# Patient Record
Sex: FEMALE | Race: WHITE
[De-identification: names, ages, dates, MRNs, and addresses within clinical notes are randomized per-mention and may not be internally consistent; named-entity substitution may affect disease eponyms.]

---

## 2018-06-09 ENCOUNTER — HOSPITAL ENCOUNTER (EMERGENCY)
Dept: HOSPITAL 97 - ER | Age: 58
Discharge: HOME | End: 2018-06-09
Payer: SELF-PAY

## 2018-06-09 VITALS — SYSTOLIC BLOOD PRESSURE: 134 MMHG | OXYGEN SATURATION: 96 % | TEMPERATURE: 97.6 F | DIASTOLIC BLOOD PRESSURE: 87 MMHG

## 2018-06-09 DIAGNOSIS — B02.9: Primary | ICD-10-CM

## 2018-06-09 DIAGNOSIS — F17.210: ICD-10-CM

## 2018-06-09 DIAGNOSIS — J44.9: ICD-10-CM

## 2018-06-09 PROCEDURE — 99283 EMERGENCY DEPT VISIT LOW MDM: CPT

## 2018-06-09 NOTE — EDPHYS
Physician Documentation                                                                           

 [facility]                                                                

Name: Glory Aceves                                                                                

Age: 58 yrs                                                                                       

Sex: Female                                                                                       

: 1960                                                                                   

MRN: [id number]                                                                                   

Arrival Date: 2018                                                                          

Time: 12:31                                                                                       

Account#: [id number]                                                                            

Bed 11                                                                                            

Private MD: None, None                                                                            

ED Physician Brian Hemphill                                                                      

HPI:                                                                                              

                                                                                             

15:02 This 58 yrs old  Female presents to ER via Ambulatory with complaints of Mouth bayron 

      Blisters.                                                                                   

15:02 The patient's rash thought to be caused by Dermatitis zoster like. The rash is located  bayron 

      on the right jaw and right temple and right ear and right cheek. The rash can be            

      described as erythematous, vesicular. Onset: The symptoms/episode began/occurred 3          

      day(s) ago. Associated signs and symptoms: Pertinent positives: itching, Pain swelling      

      of lips. Severity of symptoms: At their worst the symptoms were mild moderate in the        

      emergency department the symptoms are unchanged.                                            

                                                                                                  

Historical:                                                                                       

- Allergies:                                                                                      

12:58 No Known Allergies;                                                                     hb  

- PMHx:                                                                                           

12:58 COPD;                                                                                   hb  

- PSHx:                                                                                           

12:58 None;                                                                                   hb  

                                                                                                  

- Immunization history:: Adult Immunizations up to date.                                          

- Social history:: Smoking status: Patient uses tobacco products, smokes two packs                

  cigarettes per day.                                                                             

- Ebola Screening: : No symptoms or risks identified at this time.                                

                                                                                                  

                                                                                                  

ROS:                                                                                              

15:04 Constitutional: Negative for fever, chills, and weight loss, Eyes: Negative for injury, bayron 

      pain, redness, and discharge, Neck: Negative for injury, pain, and swelling,                

      Cardiovascular: Negative for chest pain, palpitations, and edema, Respiratory: Negative     

      for shortness of breath, cough, wheezing, and pleuritic chest pain, Abdomen/GI:             

      Negative for abdominal pain, nausea, vomiting, diarrhea, and constipation, Back:            

      Negative for injury and pain, : Negative for injury, bleeding, discharge, and             

      swelling, MS/Extremity: Negative for injury and deformity, Neuro: Negative for              

      headache, weakness, numbness, tingling, and seizure.                                        

15:04 ENT: Positive for dental pain, ear pain, rhinorrhea, sore throat, Teeth pain                

                                                                                                  

Exam:                                                                                             

15:04 Constitutional:  This is a well developed, well nourished patient who is awake, alert,  bayron 

      and in no acute distress. Eyes:  Pupils equal round and reactive to light, extra-ocular     

      motions intact.  Lids and lashes normal.  Conjunctiva and sclera are non-icteric and        

      not injected.  Cornea within normal limits.  Periorbital areas with no swelling,            

      redness, or edema. Neck:  Trachea midline, no thyromegaly or masses palpated, and no        

      cervical lymphadenopathy.  Supple, full range of motion without nuchal rigidity, or         

      vertebral point tenderness.  No Meningismus. Chest/axilla:  Normal chest wall               

      appearance and motion.  Nontender with no deformity.  No lesions are appreciated.           

      Cardiovascular:  Regular rate and rhythm with a normal S1 and S2.  No gallops, murmurs,     

      or rubs.  Normal PMI, no JVD.  No pulse deficits. Respiratory:  Lungs have equal breath     

      sounds bilaterally, clear to auscultation and percussion.  No rales, rhonchi or wheezes     

      noted.  No increased work of breathing, no retractions or nasal flaring. Abdomen/GI:        

      Soft, non-tender, with normal bowel sounds.  No distension or tympany.  No guarding or      

      rebound.  No evidence of tenderness throughout. Back:  No spinal tenderness.  No            

      costovertebral tenderness.  Full range of motion. Female :  Normal external               

      genitalia. Skin:  Warm, dry with normal turgor.  Normal color with no rashes, no            

      lesions, and no evidence of cellulitis.                                                     

15:04 Head/face: Noted is rash, swelling, that is moderate, of the  right cheek, right ear,       

      mouth, chin and right jaw.                                                                  

15:04 Eyes: Exam is negative for acute changes, injury or deformity.                              

15:04 ENT: TM's: are normal, no acute changes, Nose: nasal drainage, that is minimal, Mouth:      

      Oral mucosa: on the  right buccal mucosa, Posterior pharynx: Uvula: normal, swelling,       

      is not appreciated, erythema, that is mild, that is moderate, exudate, that is mild.        

                                                                                                  

Vital Signs:                                                                                      

12:56  / 87; Pulse 117; Resp 20; Temp 97.6; Pulse Ox 96% on R/A; Weight 45.36 kg;       hb  

      Height 5 ft. 6 in. (167.64 cm); Pain 10/10;                                                 

12:56 Body Mass Index 16.14 (45.36 kg, 167.64 cm)                                             hb  

                                                                                                  

MDM:                                                                                              

14:51 Patient medically screened.                                                             [facility] 

15:07 Data reviewed: vital signs, nurses notes.                                               [facility] 

                                                                                                  

Administered Medications:                                                                         

15:10 Drug: valACYclovir 1000 mg Route: PO;                                                   iw  

15:10 Drug: Norco 10 mg-325 mg 1 tabs Route: PO;                                              iw  

15:10 Drug: Augmentin 875 mg Route: PO;                                                       iw  

                                                                                                  

                                                                                                  

Disposition:                                                                                      

18 15:08 Discharged to Home. Impression: Zoster [herpes zoster].                            

- Condition is Fair.                                                                              

- Discharge Instructions: Shingles, Shingles, Easy-to-Read.                                       

- Prescriptions for Augmentin 875- 125 mg Oral Tablet - take 1 tablet by ORAL route               

  every 12 hours for 10 days; 20 tablet. Tylenol- Codeine #3 300-30 mg Oral Tablet -              

  take 2 tablet by ORAL route every 6 hours As needed; 30 tablet. Valtrex 1 g Oral                

  Tablet - take 1 tablet by ORAL route every 8 hours for 7 days; 21 tablet.                       

- Medication Reconciliation Form, Thank You Letter, Antibiotic Education, Prescription            

  Opioid Use, Work release form form.                                                             

- Follow up: Private Physician; When: 2 - 3 days; Reason: Recheck today's complaints,             

  Continuance of care, Re-evaluation by your physician. Follow up: Hubert Mancuso MD;              

  When: 2 - 3 days; Reason: Recheck today's complaints, Re-evaluation by your physician.          

- Problem is new.                                                                                 

- Symptoms have improved.                                                                         

                                                                                                  

                                                                                                  

                                                                                                  

Signatures:                                                                                       

Brian Hemphill MD MD cha Williams, Irene, RN RN iw Baxter, Heather, RN                     RN                                                      

                                                                                                  

Corrections: (The following items were deleted from the chart)                                    

15:30 15:08 2018 15:08 Discharged to Home. Impression: Zoster [herpes zoster].          iw  

      Condition is Fair. Forms are Medication Reconciliation Form, Thank You Letter,              

      Antibiotic Education, Prescription Opioid Use. Follow up: Private Physician; When: 2 -      

      3 days; Reason: Recheck today's complaints, Continuance of care, Re-evaluation by your      

      physician. Follow up: Hubert Mancuso; When: 2 - 3 days; Reason: Recheck today's                

      complaints, Re-evaluation by your physician. Problem is new. Symptoms have improved. bayron    

                                                                                                  

**************************************************************************************************

## 2018-06-09 NOTE — ER
Nurse's Notes                                                                                     

 [facility]                                                                

Name: Glory Aceves                                                                                

Age: 58 yrs                                                                                       

Sex: Female                                                                                       

: 1960                                                                                   

MRN: [id number]                                                                                   

Arrival Date: 2018                                                                          

Time: 12:31                                                                                       

Account#: [id number]                                                                            

Bed 11                                                                                            

Private MD: None, None                                                                            

Diagnosis: Zoster [herpes zoster]                                                                 

                                                                                                  

Presentation:                                                                                     

                                                                                             

12:55 Presenting complaint: Patient states: Painful blisters inside mouth, spreading up right hb  

      side of face, and right sided facial swelling x 1 week. Transition of care: patient was     

      not received from another setting of care. Onset of symptoms is unknown. Risk               

      Assessment: Do you want to hurt yourself or someone else? Patient reports no desire to      

      harm self or others. Care prior to arrival: None.                                           

12:55 Method Of Arrival: Ambulatory                                                           hb  

12:55 Acuity: LEXIS 3                                                                           hb  

12:55 Initial Sepsis Screen: Does the patient meet any 2 criteria? No. Patient's initial      iw  

      sepsis screen is negative. Does the patient have a suspected source of infection? No.       

      Patient's initial sepsis screen is negative.                                                

                                                                                                  

Historical:                                                                                       

- Allergies:                                                                                      

12:58 No Known Allergies;                                                                     hb  

- PMHx:                                                                                           

12:58 COPD;                                                                                   hb  

- PSHx:                                                                                           

12:58 None;                                                                                   hb  

                                                                                                  

- Immunization history:: Adult Immunizations up to date.                                          

- Social history:: Smoking status: Patient uses tobacco products, smokes two packs                

  cigarettes per day.                                                                             

- Ebola Screening: : No symptoms or risks identified at this time.                                

                                                                                                  

                                                                                                  

Screenin:39 Abuse screen: Denies threats or abuse. Denies injuries from another. Nutritional        iw  

      screening: No deficits noted. Tuberculosis screening: No symptoms or risk factors           

      identified.                                                                                 

15:20 Fall Risk None identified.                                                              iw  

                                                                                                  

Assessment:                                                                                       

14:37 General: Appears in no apparent distress. Behavior is calm, cooperative. Pain:          iw  

      Complains of pain in right cheek, right ear, right temple and right jaw Pain currently      

      is 8 out of 10 on a pain scale. Neuro: Level of Consciousness is awake, alert, obeys        

      commands, Oriented to person, place, time, situation, Moves all extremities. Full           

      function. Cardiovascular: Patient's skin is warm and dry. Respiratory: Respiratory          

      effort is even, unlabored. GI: No signs and/or symptoms were reported involving the         

      gastrointestinal system. Derm: Rash noted that is red, vesicular, on right cheek, mouth     

      and right jaw Reports pain. Musculoskeletal: Range of motion: intact in all extremities.    

                                                                                                  

Vital Signs:                                                                                      

12:56  / 87; Pulse 117; Resp 20; Temp 97.6; Pulse Ox 96% on R/A; Weight 45.36 kg;       hb  

      Height 5 ft. 6 in. (167.64 cm); Pain 10/10;                                                 

12:56 Body Mass Index 16.14 (45.36 kg, 167.64 cm)                                             hb  

                                                                                                  

ED Course:                                                                                        

12:31 Patient arrived in ED.                                                                  mr  

12:33 None, None is Private Physician.                                                        mr  

12:56 Triage completed.                                                                       hb  

12:58 Arm band placed on left wrist.                                                          hb  

14:18 Mesha Govea, RN is Primary Nurse.                                                   iw  

14:37 Patient has correct armband on for positive identification.                             iw  

14:51 Brian Hemphill MD is Attending Physician.                                             bayron 

15:07 Hubert Mancuso MD is Referral Physician.                                                 bayron 

15:29 No provider procedures requiring assistance completed. Patient did not have IV access   iw  

      during this emergency room visit.                                                           

                                                                                                  

Administered Medications:                                                                         

15:10 Drug: valACYclovir 1000 mg Route: PO;                                                   iw  

15:10 Drug: Norco 10 mg-325 mg 1 tabs Route: PO;                                              iw  

15:10 Drug: Augmentin 875 mg Route: PO;                                                       iw  

                                                                                                  

                                                                                                  

Outcome:                                                                                          

15:08 Discharge ordered by MD.                                                                bayron 

15:28 Discharged to home ambulatory.                                                          iw  

15:28 Condition: good                                                                             

15:28 Discharge instructions given to patient, Instructed on discharge instructions, follow       

      up and referral plans. medication usage, Demonstrated understanding of instructions,        

      follow-up care, medications, Prescriptions given X 3.                                       

15:30 Patient left the ED.                                                                    iw  

                                                                                                  

Signatures:                                                                                       

Brian Hemphill MD MD cha Rivera, Maria                                mr                                                   

Mesha Govea, RAMÍREZ ELMORE                                                      

Chrissie Hubbard RN RN   hb                                                   

                                                                                                  

Corrections: (The following items were deleted from the chart)                                    

12:57 12:55 Presenting complaint: Patient states: Blisters inside mouth, spreading up right   hb  

      side of face x 1 week. hb                                                                   

12:59 12:55 Presenting complaint: Patient states: Painful blisters inside mouth, spreading up hb  

      right side of face, and right sided facial swelling x 1 week. hb                            

12:59 12:55 Acuity: LEXIS 4 hb                                                                  hb  

                                                                                                  

**************************************************************************************************